# Patient Record
Sex: FEMALE | Race: WHITE
[De-identification: names, ages, dates, MRNs, and addresses within clinical notes are randomized per-mention and may not be internally consistent; named-entity substitution may affect disease eponyms.]

---

## 2017-03-18 ENCOUNTER — HOSPITAL ENCOUNTER (OUTPATIENT)
Dept: HOSPITAL 59 - ER | Age: 72
Setting detail: OBSERVATION
LOS: 1 days | Discharge: HOME | End: 2017-03-19
Attending: FAMILY MEDICINE | Admitting: FAMILY MEDICINE
Payer: MEDICARE

## 2017-03-18 DIAGNOSIS — Z79.4: ICD-10-CM

## 2017-03-18 DIAGNOSIS — J18.9: Primary | ICD-10-CM

## 2017-03-18 DIAGNOSIS — I25.10: ICD-10-CM

## 2017-03-18 DIAGNOSIS — E11.9: ICD-10-CM

## 2017-03-18 DIAGNOSIS — R79.89: ICD-10-CM

## 2017-03-18 LAB
ANION GAP SERPL CALC-SCNC: 7.8 MMOL/L (ref 7–16)
BASOPHILS NFR BLD: 1 % (ref 0–6)
BUN SERPL-MCNC: 14 MG/DL (ref 7–17)
CK MB SERPL-MCNC: 1.4 UG/L (ref 0–6)
CO2 SERPL-SCNC: 25.2 MMOL/L (ref 22–30)
CREAT SERPL-MCNC: 0.6 MG/DL (ref 0.52–1.04)
CREATINE PHOSPHOKINASE: 67 U/L (ref 30–135)
EOSINOPHIL NFR BLD: 2 % (ref 0–6)
ERYTHROCYTE [DISTWIDTH] IN BLOOD BY AUTOMATED COUNT: 12.8 % (ref 11.5–14.5)
EST GLOMERULAR FILTRATION RATE: > 60 ML/MIN
GLUCOSE SERPL-MCNC: 282 MG/DL (ref 70–110)
HCT VFR BLD CALC: 37.4 % (ref 35–47)
HGB BLD-MCNC: 12.7 GM/DL (ref 11.6–16)
LYMPHOCYTES NFR BLD: 6 % (ref 16–45)
MCH RBC QN AUTO: 30.3 PG (ref 27–33)
MCHC RBC AUTO-ENTMCNC: 34 G/DL (ref 32–36)
MCV RBC AUTO: 89.3 FL (ref 81–97)
MONOCYTES NFR BLD: 10 % (ref 0–9)
NEUTROPHILS NFR BLD AUTO: 81 % (ref 47–80)
NEUTS BAND NFR BLD: 0 % (ref 0–5)
PLATELET # BLD: 104 K/UL (ref 130–400)
PMV BLD AUTO: 11 FL (ref 7.4–10.4)
RBC # BLD AUTO: 4.19 M/UL (ref 3.8–5.4)
TROPONIN I SERPL-MCNC: 0.04 NG/ML (ref 0–0.03)
WBC # BLD AUTO: 6.3 K/UL (ref 4.2–12.2)

## 2017-03-18 PROCEDURE — 96365 THER/PROPH/DIAG IV INF INIT: CPT

## 2017-03-18 PROCEDURE — 71275 CT ANGIOGRAPHY CHEST: CPT

## 2017-03-18 PROCEDURE — 82550 ASSAY OF CK (CPK): CPT

## 2017-03-18 PROCEDURE — 84484 ASSAY OF TROPONIN QUANT: CPT

## 2017-03-18 PROCEDURE — 93010 ELECTROCARDIOGRAM REPORT: CPT

## 2017-03-18 PROCEDURE — 71020: CPT

## 2017-03-18 PROCEDURE — 94760 N-INVAS EAR/PLS OXIMETRY 1: CPT

## 2017-03-18 PROCEDURE — 82553 CREATINE MB FRACTION: CPT

## 2017-03-18 PROCEDURE — 93041 RHYTHM ECG TRACING: CPT

## 2017-03-18 PROCEDURE — 80048 BASIC METABOLIC PNL TOTAL CA: CPT

## 2017-03-18 PROCEDURE — 99285 EMERGENCY DEPT VISIT HI MDM: CPT

## 2017-03-18 PROCEDURE — 99217: CPT

## 2017-03-18 PROCEDURE — 85379 FIBRIN DEGRADATION QUANT: CPT

## 2017-03-18 PROCEDURE — 93005 ELECTROCARDIOGRAM TRACING: CPT

## 2017-03-18 PROCEDURE — 85027 COMPLETE CBC AUTOMATED: CPT

## 2017-03-18 PROCEDURE — 99220: CPT

## 2017-03-18 NOTE — EMERGENCY DEPARTMENT RECORD
History of Present Illness





- General


Chief Complaint: Shortness of breath


Stated Complaint: BHARATHI,LT ARM PAIN (NO CHEST PAIN)


Time Seen by Provider: 03/18/17 19:59


Source: Patient


Mode of Arrival: Ambulatory


Limitations: No limitations





- History of Present Illness


Initial Comments: 


The patient is here due to a one day hx of L arm pain. The pain is located over 

the humerus between the L shoulder and elbow. She denies any trauma or injury 

but states the pain comes and goes. She also is having mild BHARATHI for 2 days with 

a cough but denies any CP or any chest discomfort. She believes she is coming 

down with a cold. There has been no sputum with the cough and no fever. The 

patient does have a hx of CAD and has had 2 stents placed in 2008 and 2009. She 

did have pain with the stents which she is clearly not having now.





MD Complaint: Shortness of breath


Onset/Timing: 10


-: Hour(s)


Radiation: Left arm


Severity: Mild


Severity scale (1-10): 7


Quality: Aching


Consistency: Constant


Improves With: Nothing


Worsens With: Nothing


Associated Symptoms: Cough


Treatments Prior to Arrival: None





- Related Data


Home Oxygen Therapy: No


 Home Medications











 Medication  Instructions  Recorded  Confirmed  Last Taken


 


Insulin Aspart [Novolog] 8 unit SQ BIDDIUR 07/06/15 03/18/17 03/17/17


 


Insulin Detemir [Levemir] 70 units INJ DAILY 07/06/15 03/18/17 03/17/17


 


Lisinopril [Zestril] 5 mg PO DAILY 07/06/15 03/18/17 03/17/17


 


Aspirin 81 mg PO QD  tab.chew 10/20/16 03/18/17 03/17/17











 Allergies











Allergy/AdvReac Type Severity Reaction Status Date / Time


 


aspirin Allergy Intermediate NAUSEA Verified 03/18/17 19:36


 


Sulfa (Sulfonamide Allergy Intermediate NAUSEA AND Verified 03/18/17 19:36





Antibiotics)   VOMITING  


 


penicillin V Allergy Mild NAUSEA Verified 03/18/17 19:36


 


clindamycin AdvReac  NAUSEA Verified 03/18/17 19:38














Travel Screening





- Travel/Exposure Within Last 30 Days


Have you traveled within the last 30 days?: No





- Travel/Exposure Within Last Year


Have you traveled outside the [place] in the last year?: No





- Additonal Travel Details


Have you been exposed to anyone with a communicable illness?: No





- Travel Symptoms


Symptom Screening: None





Review of Systems


Constitutional: Denies: Chills, Fever


Eyes: Denies: Eye discharge


ENT: Reports: Congestion


Respiratory: Reports: Cough, Dyspnea


Cardiovascular: Denies: Arrhythmia, Chest pain, Dyspnea on exertion


Endocrine: Reports: Fatigue


Gastrointestinal: Denies: Diarrhea, Vomiting


Genitourinary: Denies: Dysuria


Musculoskeletal: Denies: Back pain


Skin: Denies: Bruising





Past Medical History





- SOCIAL HISTORY


Smoking Status: Never smoker


Alcohol Use: None


Drug Use: None





- RESPIRATORY


Hx Respiratory Disorders: Yes


Hx Sleep Apnea: Yes (dx)


Hx of CPAP: No





- CARDIOVASCULAR


Hx Cardio Disorders: Yes


Hx Cardiac Cath: Yes


Hx Edema: Yes


Hx Heart Attack: Yes (2008 -echo in 2016)


Hx Hypertension: Yes ((denies HTN on meds for kidneys))


Hx Coronary Stent: Yes (1-2008, 2-2009)





- NEURO


Hx Neuro Disorders: Yes


Hx of Migraines: Yes (hx)


Hx Neuropathy: Yes (right hand, left great toe)





- GI


Hx GI Disorders: Yes


Hx Abdominal Pain: Yes (epigastric)


Hx Reflux: Yes ("belching/hiccups")


Hx Hiatal Hernia: Yes


Hx of Polyps: Yes





- 


Hx Genitourinary Disorders: No





- ENDOCRINE


Hx Endocrine Disorders: Yes





- MUSCULOSKELETAL


Hx Musculoskeletal Disorders: Yes


Comment:: right shoulder injury





- PSYCH


Hx Psych Problems: No





- HEMATOLOGY/ONCOLOGY


Hx Hematology/Oncology Disorders: No





Family Medical History


Any Significant Family History?: No


Hx Cancer: Father, Brother/Sister, Grandparents


*Cancer Comment: colon, breast, ovarian


Hx Diabetes: Brother/Sister


Hx Heart Disease: Mother





Physical Exam





- General


General Appearance: Alert, Oriented x3, Cooperative, No acute distress





- Head


Head exam: Atraumatic, Normocephalic, Normal inspection





- Eye


Eye exam: Normal appearance, PERRL





- ENT


Throat exam: Normal inspection.  negative: Tonsillar erythema, Tonsillar exudate





- Neck


Neck exam: Normal inspection, Full ROM.  negative: Tenderness





- Respiratory


Respiratory exam: Normal lung sounds bilaterally.  negative: Respiratory 

distress





- Cardiovascular


Cardiovascular Exam: Regular rate, Normal rhythm, Systolic murmur (2/6 (chronic 

per the patient))





- GI/Abdominal


GI/Abdominal exam: Soft, Normal bowel sounds.  negative: Tenderness





- Extremities


Extremities exam: Normal inspection, Full ROM, Normal capillary refill, 

Tenderness (There is reproducible tenderness to the L humerus area.)





- Neurological


Neurological exam: Normal gait.  negative: Abnormal gait





Course





 Vital Signs











  03/18/17





  19:36


 


Temperature 99.1 F


 


Pulse Rate 80


 


Respiratory 20





Rate 


 


Blood Pressure 206/68


 


Pulse Ox 97














- Reevaluation(s)


Reevaluation #1: 


The patient is doing very well at this time. She denies any pain or BHARATHI. We are 

waiting on lab results at this time.





03/18/17 21:00





Reevaluation #2: 


The patient is doing very well. She denies any pain or BHARATHI. I did discuss the 

CT and lab results and the need for admission and she does agree to the plan. 

We will admit her to Dr. Boggs.





03/18/17 22:21








Medical Decision Making





- Data Complexity


MDM Data: Labs Ordered and/or Reviewed, X-Ray Ordered and/or Reviewed, EKG 

Ordered and/or Reviewed





- Lab Data


Result diagrams: 


 03/18/17 20:37





 03/18/17 20:37





- EKG Data


-: EKG Interpreted by Me


EKG: No Acute Changes, Unchanged From Previous





- Radiology Data


Radiology results: Report reviewed (CXR: Neg.   Chest CT: No PE but possible 

pneumonitis in the lower lobes and RML.)





Disposition


Disposition: Admit


Clinical Impression: 


 Acute dyspnea


Disposition: Still a Patient at [facility]


Decision to Admit: Admit from ER


Decision to Admit Date: 03/18/17


Decision to Admit Time: 22:22


Accepting Physician: Flakita


Time Discussed w/Accepting Physician: 22:22


Instructions:  Dyspnea (ED)


Forms:  Patient Portal Access


Time of Disposition: 22:22

## 2017-03-19 LAB
CK MB SERPL-MCNC: 0.9 UG/L (ref 0–6)
TROPONIN I SERPL-MCNC: 0.07 NG/ML (ref 0–0.03)

## 2017-03-19 NOTE — DISCHARGE NOTE
Discharge Note





- Date


Date of Discharge Note: 03/19/17


Disposition: Home, Self-Care


Condition: (1) Good


Instructions:  Dyspnea (ED)


Additional Instructions: 


follow up with Dr Montanez in 3 to 8 days 


follow up with Dr. Mckeon in 5 days for cardiac consult and possible stress test.


take levaquin 500 mg once a day for pneumonia


tylenol for upper back and neck pain and heat to neck and upper back three 

times a day


Prescriptions: 


Levofloxacin [Levaquin Tab] 500 mg PO DAILY #7 tab


Forms:  Patient Portal Access

## 2017-03-20 NOTE — RADIOLOGY REPORT
EXAM:  CHEST, TWO VIEWS



HISTORY:  DIFFICULTY BREATHING.  



TECHNIQUE:  Frontal and lateral views of the chest were performed.  



FINDINGS:  The heart size is normal.  The lung fields are clear.  The osseous 
structures are normal.  



IMPRESSION:  

NEGATIVE CHEST EXAMINATION.  



JOB NUMBER:  [id number]
MTDD

## 2017-03-20 NOTE — CT ANGIOGRAM REPORT
EXAM:  CTA OF THE CHEST WITH CONTRAST 



HISTORY:  DIFFICULTY IN BREATHING, CHEST PAIN.  



TECHNIQUE:  CTA of the chest was performed after intravenous administration of 
80 ml of Omnipaque 350 contrast material.  Sagittal and coronal MIP images were 
performed on an independent workstation.  



FINDINGS:  There is no mass or filling defect to suggest pulmonary embolism.  
The thoracic aorta appears normal.  There is coronary artery vascular 
calcification.  There is cardiomegaly without pericardial effusion.  There are 
bilateral lower lobe infiltrates suggestive of pneumonia.  The visualized upper 
abdominal structures are normal.  



IMPRESSION:  

1.  NO CTA FINDINGS SUGGESTIVE OF PULMONARY EMBOLISM.  



2.  SUBTLE INFILTRATES IN BOTH LUNG BASES LIKELY RELATED TO PNEUMONITIS.  



3.  CARDIOMEGALY WITHOUT PERICARDIAL EFFUSION.  



JOB NUMBER:  [id number]
[facility]

## 2017-03-20 NOTE — HISTORY AND PHYSICAL REPORT
DATE OF DICTATION:  03/19/2017.



CHIEF COMPLAINT:  Cough, congestion, and some left shoulder and arm numbness.  



HISTORY OF PRESENT ILLNESS:  She states that she woke up yesterday morning, on 
the day of admission, with some tingling in the left arm.  She complained of 
being short of breath around 10:00 in the morning.  She has had a cold and 
believes that is why she is having some difficulty breathing.  The patient 
denies any injuries to her arm.  She is painful when palpating the upper 
thoracic back area by the neck.  The pain goes down into her arm when doing 
that.  She was seen in the emergency department by Dr. Holland who worked her up 
for cardiac disease.  The troponin I was in the indeterminate range.  She also 
has an elevated D-dimer, and so a CT angiogram was done.  This was negative for 
a pulmonary embolus but showed early pneumonia.  She was put in the hospital 
for intravenous Levaquin, serial cardiac enzymes, and further evaluation.  She 
is not shortness of breath at this time at my examination.  She has no no chest 
pain and no arm pain.  She does have pain in the neck and upper thoracic area 
on the left side when I palpate that area.  She states that the cough has been 
going on for ten days, but she just thought it was a cold which was not getting 
better.  She states that her throat is a little dry.  



PAST MEDICAL HISTORY:  Diabetes mellitus, coronary artery disease.  She had 
stents placed in 2008 when she had a myocardial infarction.  She sees Dr. Mckeon.  She also has gastroesophageal reflux disease.  Her diabetes mellitus 
has been going on for 20 years.  She is on both Lantus and NovoLog.  She has 
degenerative joint disease in her back.  She has had a right shoulder injury. 



PAST SURGICAL HISTORY:  Cholecystectomy, exploratory tubal, appendectomy, 
bilateral trigger fingers, colonoscopy, and EGD.



MEDICATIONS ON ADMISSION:  Lisinopril 5.0 mg q. daily, Levemir 70 units q. daily
, NovoLog 8.0 units b.i.d. before meals, aspirin 81 mg q. daily.



ALLERGIES:  Sulfa, penicillin, and clindamycin.



FAMILY/PSYCHOSOCIAL HISTORY:  Cancer in the father, brother, sister, and 
grandparents; colon, breast, and ovarian.  There is diabetes in her brothers 
and sisters.  Her mother has heart disease.  She has never smoked cigarettes.  
No alcohol or drug use.



REVIEW OF SYSTEMS:  

HEENT:  She has had a cough, congestion, and cold for the last ten days.  It 
has been progressively getting worse.  When she woke up yesterday, on the day 
of admission, she had numbness in the left shoulder.  She was concerned about 
that and came in.  Mostly, she told the nurse she was short of breath, and she 
thought it was from her cold.  She wanted that evaluated.

Cardiovascular:  No chest pain or palpitations.  No arrhythmias.  She is being 
followed by Dr. Mckeon.  She had an echocardiogram in December of 2016 at [facility] which was normal, according to the patient.

Respiratory:  See Chief Complaint.  She has been short of breath.  She has been 
coughing over the last ten days.  She was diagnosed in the emergency department 
with pneumonia.

Gastrointestinal:  No nausea, vomiting, diarrhea, black stools, or bloody 
stools.

Genitourinary:  No dysuria, hematuria, frequency, or burning on urination.

Musculoskeletal:  She has pain in her back and has also had problems with her 
right shoulder.  The left shoulder and upper back are giving her troubles now.

Neurologic:  No cerebrovascular accident, paralysis, or paraesthesias.

Endocrine:  She has had diabetes for 20 years.  

Integument:  No rash, ulcers, changing moles, or yellow skin.



PHYSICAL EXAMINATION:  

General:  Height is 5 feet, 3 inches.  Weight is 192 pounds.

Vital Signs:  Temperature is 99.7, pulse is 79, blood pressure is 150/65, 
respiratory rate is 18, pulse oximetry is 97% on room air.  

HEENT:  Pupils are equal, round, and reactive to light and accommodation.  
Extraocular muscles are intact.  The throat is clear.  The nose is clear.  The 
tympanic membranes are gray.  

Neck:  The neck is supple.  No jugular venous distension.  No hepatojugular 
reflex.  No carotid bruit.  The thyroid is smooth.  

Cardiovascular:  Regular rate and rhythm without murmurs, clicks, rubs, or 
gallops.

Respiratory:  Clear to auscultation and percussion.  There might be some 
bilateral lower base rales which are very faint.  This seemed to clear when she 
coughed.

Abdomen:  Soft and nontender.  No hepatosplenomegaly.  No masses.  No 
tenderness.  Bowel sounds are active.  No bruit.

Extremities:  No pitting edema.  No cyanosis.  No clubbing.  Full range of 
motion.  Peripheral pulses are good.

Breasts, Gynecological, and Rectal Examinations:  Deferred.

Neurological Examination:  Cranial nerves II through XII are intact.  No gross 
defect.  Sensation is normal.  Strength is normal.  Deep tendon reflexes are 
equal bilaterally.  Babinski is negative.

Mental Status:  Alert and oriented times three.

  

IMPRESSIONS:  

1.  Pneumonia bilaterally as seen by CT scan.  Troponin I is in the 
indeterminate range.  However, she has no acute changes on the EKG, and she is 
having no chest pain with walking around the room.

2.  Diabetes mellitus, insulin dependent.



PLAN:  She is looking very good at this point.  She is not requiring oxygen.  
She is eating, drinking, and walking around the room without difficulty.  She 
is on Levaquin.  We will send her home with Levaquin 500 mg q. daily for seven 
days.  Follow up with Dr. Montanez in three to eight days.  Follow up with Dr. Mckeon 
this coming week for for evaluation of her indeterminate troponin I. 







______________________________      _________________________________

 Andrea Boggs D.O.         Date      Time



JOB NUMBER:  [id number]
MTDD

## 2017-03-22 NOTE — DISCHARGE SUMMARY
DISCHARGE DIAGNOSES: 

1.  Bilateral pneumonia.  Seen by CTA scan. 

2.  Troponin I is indeterminate. 

3.  Diabetes mellitus insulin dependent. 

4.  Coronary artery disease by history.  Cardiac stents 2008.  Dr. Mckeon is her cardiologist. 

5.  Upper thoracic muscular strain with radiculopathy and to the left arm. 



ATTENDING PHYSICIAN: Andrea Boggs DO



HISTORY OF PRESENT ILLNESS: This is a 71-year-old female who came in because of shortness of breath, 
dyspnea and a cough.  She also complained of some numbness in her left shoulder and arm.  She had 
some neck and thoracic back pain.  She was worked up in the Emergency Department by Dr. Holland with a 
cardiac workup.  Her D-dimer was elevated.  A CTA was done which showed no PEs but did show 
bilateral pneumonia, ground glass appearance of the CAT scan, and her EKG showing no acute changes.  
Troponin I was at the indeterminate range.  It went up slightly with the second troponin I.  The 
CK-MB's went down.  EKG: The second EKG was the same as the first EKG, showing no acute changes and 
normal sinus rhythm.  She is having no chest pain and her breathing is much better today, and the 
numbness in the arm is gone.  She does have some pain in the neck and upper thoracic area when I 
palpated that area on examination.  



THERAPY PROVIDED: The patient was put on the monitor, started on Levaquin 500 mg IV.  At my 
examination, she was asymptomatic, she was not having any problems breathing, she was walking around 
the room, she ate breakfast nicely.  She had some pain on palpation of the neck and upper thoracic 
area that did not sound cardiac in origin at all.  At this point I felt it safe to discharge her 
home and to finish the workup as an outpatient. 



HOSPITAL COURSE: The patient is improved. 



CONDITION AT DISCHARGE: Improved. 



DISPOSITION: She is admitted as an observation patient. 



DISCHARGE INSTRUCTIONS: Follow up with Dr. Montanez in 3-8 days.  Follow up with Dr. Mckeon in the next 
week and within 5 days for a cardiac evaluation and a possible stress test.  Use Tylenol for the 
pain in her back and neck area.  Heat to the upper back and neck area 3 times a day. 



DISCHARGE MEDICATIONS:

1.  Levaquin 500 mg daily for 7 days. 

Her home medications: 

2.  Lisinopril 5 mg daily. 

3.  Levemir 70 units daily. 

4.  NovoLog 8 units b.i.d. p.r.n. depending on what her sugars are before meals. 

5.  Aspirin 81 mg daily. 



ACTIVITY: Just walking until she is evaluated by Dr. Mckeon for more strenuous activity. 



CC: Dr. Linda KONG

## 2017-07-01 ENCOUNTER — HOSPITAL ENCOUNTER (EMERGENCY)
Dept: HOSPITAL 59 - ER | Age: 72
Discharge: HOME | End: 2017-07-01
Payer: MEDICARE

## 2017-07-01 DIAGNOSIS — N30.01: Primary | ICD-10-CM

## 2017-07-01 LAB
APPEARANCE UR: (no result)
BACTERIA #/AREA URNS HPF: (no result) /[HPF]
BILIRUB UR-MCNC: NEGATIVE MG/DL
COLOR UR: YELLOW
KETONES UR QL STRIP: NEGATIVE
L PNEUMO AB SER IA-ACNC: (no result)
NITRITE UR QL STRIP: NEGATIVE
RBC # UR STRIP: (no result) /UL
URINE LEUKOCYTE ESTERASE: (no result)
UROBILINOGEN UR STRIP-ACNC: 1 E.U./DL (ref 0.2–1)
WBC #/AREA URNS HPF: (no result) /[HPF]

## 2017-07-01 PROCEDURE — 99282 EMERGENCY DEPT VISIT SF MDM: CPT

## 2017-07-01 PROCEDURE — 81001 URINALYSIS AUTO W/SCOPE: CPT

## 2017-07-01 NOTE — EMERGENCY DEPARTMENT RECORD
History of Present Illness





- General


Chief complaint: Female Urogenital Problem


Stated complaint: hip pain/ yeast infection


Time Seen by Provider: 17 08:13


Source: Patient


Mode of Arrival: Ambulatory


Limitations: No limitations





- History of Present Illness


Initial comments: 





72 yo female presents to ED with a CC of urinary discomfort and left sided back 

pain symptoms.  Patient reports that her back pain symptoms occur 

intermittently for several years with activity, reports taking Tylenol last 

night which improved her symptoms.  Patient reports dysuria symptoms that began 

this morning, reports symptoms may be related to infection vs. yeast.  Patient 

denies vaginal discharge , fevers, chills, or recent illness symptoms.


MD Complaint: Dysuria


Onset/Timin


-: Month(s)


Quality: Aching


Consistency: Constant


Worsens with: Movement


Associated Symptoms: Denies other symptoms





- Related Data


 Home Medications











 Medication  Instructions  Recorded  Confirmed  Last Taken


 


Insulin Aspart [Novolog] 8 unit SQ BIDDIUR 07/06/15 07/01/17 06/30/17


 


Insulin Detemir [Levemir] 70 units INJ DAILY 07/06/15 07/01/17 07/01/17


 


Lisinopril [Zestril] 5 mg PO DAILY 07/06/15 07/01/17 07/01/17


 


Aspirin 81 mg PO QD  tab.chew 10/20/16 07/01/17 07/01/17








 Previous Rx's











 Medication  Instructions  Recorded


 


Fluconazole [Diflucan] 150 mg PO ONCE #1 tab 17


 


Nitrofurantoin Mono [Macrobid] 100 mg PO BID #14 capsule 17











 Allergies











Allergy/AdvReac Type Severity Reaction Status Date / Time


 


aspirin Allergy Intermediate NAUSEA Unverified 17 08:10


 


Sulfa (Sulfonamide Allergy Intermediate NAUSEA AND Unverified 17 08:10





Antibiotics)   VOMITING  


 


penicillin V Allergy Mild NAUSEA Unverified 17 08:10


 


clindamycin AdvReac  NAUSEA Unverified 17 08:10














Travel Screening





- Travel/Exposure Within Last 30 Days


Have you traveled within the last 30 days?: No





- Travel/Exposure Within Last Year


Have you traveled outside the [place] in the last year?: No





- Additonal Travel Details


Have you been exposed to anyone with a communicable illness?: No





- Travel Symptoms


Symptom Screening: None





Review of Systems


Constitutional: Denies: Chills, Fever, Malaise, Night sweats


Eyes: Denies: Eye discharge, Eye pain


ENT: Denies: Congestion, Ear pain, Epistaxis


Respiratory: Denies: Cough, Dyspnea


Cardiovascular: Denies: Chest pain, Dyspnea on exertion


Endocrine: Denies: Fatigue


Gastrointestinal: Denies: Abdominal pain, Nausea, Vomiting


Genitourinary: Denies: Incontinence, Retention


Musculoskeletal: Reports: Back pain.  Denies: Arthralgia, Gout, Joint swelling


Skin: Denies: Bruising, Change in color


Neurological: Denies: Abnormal gait, Confusion, Headache, Seizure


Psychiatric: Denies: Anxiety


Hematological/Lymphatic: Denies: Anemia, Blood Clots





Past Medical History





- SOCIAL HISTORY


Smoking Status: Never smoker


Alcohol Use: None


Drug Use: None





- RESPIRATORY


Hx Respiratory Disorders: Yes


Hx Sleep Apnea: Yes (dx)


Hx of CPAP: No





- CARDIOVASCULAR


Hx Cardio Disorders: Yes


Hx Cardiac Cath: Yes


Hx Edema: Yes


Hx Heart Attack: Yes ( -echo in 2016)


Hx Hypertension: Yes ((denies HTN on meds for kidneys))


Hx Coronary Stent: Yes (-, 2-)





- NEURO


Hx Neuro Disorders: Yes


Hx of Migraines: Yes (hx)


Hx Neuropathy: Yes (right hand, left great toe)





- GI


Hx GI Disorders: Yes


Hx Abdominal Pain: Yes (epigastric)


Hx Reflux: Yes ("belching/hiccups")


Hx Hiatal Hernia: Yes


Hx of Polyps: Yes





- 


Hx Genitourinary Disorders: No





- ENDOCRINE


Hx Endocrine Disorders: Yes





- MUSCULOSKELETAL


Hx Musculoskeletal Disorders: Yes


Comment:: right shoulder injury





- PSYCH


Hx Psych Problems: No





- HEMATOLOGY/ONCOLOGY


Hx Hematology/Oncology Disorders: No





Family Medical History


Any Significant Family History?: No


Hx Cancer: Father, Brother/Sister, Grandparents


*Cancer Comment: colon, breast, ovarian


Hx Diabetes: Brother/Sister


Hx Heart Disease: Mother





Physical Exam





- General


General Appearance: Alert, Oriented x3, Cooperative, Mild distress


Limitations: No limitations





- Head


Head exam: Atraumatic, Normocephalic, Normal inspection


Head exam detail: negative: Abrasion, Contusion, Pugh's sign, General 

tenderness, Hematoma, Laceration





- Eye


Eye exam: Normal appearance.  negative: Conjunctival injection, Periorbital 

swelling, Periorbital tenderness, Scleral icterus





- ENT


Ear exam: negative: Auricular hematoma, Auricular trauma


Nasal Exam: negative: Active bleeding, Discharge, Dried blood


Mouth exam: negative: Drooling, Laceration, Muffled voice, Tongue elevation





- Neck


Neck exam: Normal inspection.  negative: Meningismus, Tenderness





- Respiratory


Respiratory exam: Normal lung sounds bilaterally.  negative: Rales, Respiratory 

distress, Rhonchi, Stridor





- Cardiovascular


Cardiovascular Exam: Regular rate, Normal rhythm, Normal heart sounds





- GI/Abdominal


GI/Abdominal exam: Soft.  negative: Rebound, Rigid, Tenderness





- Rectal


Rectal exam: Deferred





- 


 exam: Deferred





- Extremities


Extremities exam: Normal inspection.  negative: Pedal edema, Tenderness





- Back


Back exam: Reports: Paraspinal tenderness (left lumbar paravertebral tenderness)

.  Denies: CVA tenderness (R), CVA tenderness (L)





- Neurological


Neurological exam: Alert, Normal gait, Oriented X3





- Psychiatric


Psychiatric exam: Normal affect, Normal mood





- Skin


Skin exam: Normal color.  negative: Abrasion


Type of lesion: negative: abrasion





Course





 Vital Signs











  17





  08:02


 


Temperature 97.5 F L


 


Pulse Rate 73


 


Respiratory 16





Rate 


 


Blood Pressure 143/61


 


Pulse Ox 98














- Reevaluation(s)


Reevaluation #1: 





17 08:43


UA reviewed, 3-6 RBCs does not seem compatible with ureteral calculi, therfore 

CT imaging for stones does not appear indicated at this time.  Patient's 

clinical appearance is not c/w kidney infection/calculus either.  Will treat 

for both possible UTI and yeast with instructions to return for any worsening 

of her symptoms.  Patient agrees with the plan of care as discussed, and 

appears stable for discharge at this time.





Disposition


Disposition: Discharge


Clinical Impression: 


 Yeast cystitis





UTI (urinary tract infection)


Qualifiers:


 Urinary tract infection type: acute cystitis Hematuria presence: with 

hematuria Qualified Code(s): N30.01 - Acute cystitis with hematuria





Disposition: Home, Self-Care


Condition: (2) Stable


Instructions:  Urinary Tract Infection in Women (ED)


Additional Instructions: 


Return to ED if your symptoms worsen or if you have any concerns.


Macrobid and Diflucan as directed.


Follow-up with your family doctor in 3-5 days as directed.


Prescriptions: 


Fluconazole [Diflucan] 150 mg PO ONCE #1 tab


Nitrofurantoin Mono [Macrobid] 100 mg PO BID #14 capsule


Forms:  Patient Portal Access


Time of Disposition: 08:47

## 2017-12-08 ENCOUNTER — HOSPITAL ENCOUNTER (EMERGENCY)
Dept: HOSPITAL 59 - ER | Age: 72
Discharge: ANOTHER HOSPITAL | End: 2017-12-08
Payer: MEDICARE

## 2017-12-08 DIAGNOSIS — I25.2: ICD-10-CM

## 2017-12-08 DIAGNOSIS — R79.89: ICD-10-CM

## 2017-12-08 DIAGNOSIS — I10: ICD-10-CM

## 2017-12-08 DIAGNOSIS — R07.2: Primary | ICD-10-CM

## 2017-12-08 DIAGNOSIS — R05: ICD-10-CM

## 2017-12-08 LAB
ALBUMIN SERPL-MCNC: 3 G/DL (ref 4–5)
ALBUMIN/GLOB SERPL: 0.7 {RATIO} (ref 1.1–1.8)
ALP SERPL-CCNC: 76 U/L (ref 35–104)
ALT SERPL-CCNC: 16 U/L (ref ?–33)
ANION GAP SERPL CALC-SCNC: 13 MMOL/L (ref 7–16)
AST SERPL-CCNC: 37 U/L (ref 10–35)
BASOPHILS NFR BLD: 0.3 % (ref 0–6)
BILIRUB SERPL-MCNC: 0.5 MG/DL (ref 0.2–1)
BUN SERPL-MCNC: 26 MG/DL (ref 8–23)
CO2 SERPL-SCNC: 23 MMOL/L (ref 22–29)
CREAT SERPL-MCNC: 0.9 MG/DL (ref 0.5–0.9)
EOSINOPHIL NFR BLD: 0.9 % (ref 0–6)
ERYTHROCYTE [DISTWIDTH] IN BLOOD BY AUTOMATED COUNT: 13.2 % (ref 11.5–14.5)
EST GLOMERULAR FILTRATION RATE: > 60 ML/MIN
GLOBULIN SER-MCNC: 4.1 GM/DL (ref 1.4–4.8)
GLUCOSE SERPL-MCNC: 421 MG/DL (ref 74–109)
GRANULOCYTES NFR BLD: 80.5 % (ref 47–80)
HCT VFR BLD CALC: 35.3 % (ref 35–47)
HGB BLD-MCNC: 11.9 GM/DL (ref 11.6–16)
LYMPHOCYTES NFR BLD AUTO: 12.8 % (ref 16–45)
MCH RBC QN AUTO: 30.7 PG (ref 27–33)
MCHC RBC AUTO-ENTMCNC: 33.7 G/DL (ref 32–36)
MCV RBC AUTO: 91.2 FL (ref 81–97)
MONOCYTES NFR BLD: 5.5 % (ref 0–9)
PLATELET # BLD: 190 K/UL (ref 130–400)
PMV BLD AUTO: 10.9 FL (ref 7.4–10.4)
PROT SERPL-MCNC: 7.1 G/DL (ref 6.6–8.7)
RBC # BLD AUTO: 3.87 M/UL (ref 3.8–5.4)
WBC # BLD AUTO: 7.4 K/UL (ref 4.2–12.2)

## 2017-12-08 PROCEDURE — 93010 ELECTROCARDIOGRAM REPORT: CPT

## 2017-12-08 PROCEDURE — 99285 EMERGENCY DEPT VISIT HI MDM: CPT

## 2017-12-08 PROCEDURE — 80053 COMPREHEN METABOLIC PANEL: CPT

## 2017-12-08 PROCEDURE — 71020: CPT

## 2017-12-08 PROCEDURE — 85025 COMPLETE CBC W/AUTO DIFF WBC: CPT

## 2017-12-08 PROCEDURE — 84484 ASSAY OF TROPONIN QUANT: CPT

## 2017-12-08 PROCEDURE — 93005 ELECTROCARDIOGRAM TRACING: CPT

## 2017-12-08 NOTE — EMERGENCY DEPARTMENT RECORD
History of Present Illness





- General


Chief Complaint: Chest Pain


Stated Complaint: CHEST PAIN AND SHOULDER PAIN


Time Seen by Provider: 17 19:51


Source: Patient


Mode of Arrival: Wheelchair


Limitations: No limitations





- History of Present Illness


Initial Comments: 





73 yo female presents to ED for evaluation of chest pain across the chest, 

radiating to her shoulders that began 1 hours PTA.  Patient reports taking 

Nitro SL prior to arrival which improved her symptoms, however her symptoms 

returned prompting presentation to the ED.  Patient reports previous "stent in 

side of a stent" several years ago, reports that she has not undergone stress 

testing in several years (sees Dr. Mckeon).  Patient denies fevers, chills, or 

recent illness other than a recent ear infection.


MD Complaint: Chest pain


Onset/Timin


-: Hour(s)


Pain Location: Substernal


Pain Radiation: RUE, LUE


Severity: Moderate


Quality: Aching


Consistency: Intermittent


Improves With: Nitroglycerin


Worsens With: Nothing


Context: New medications (prednisone)


Treatments Prior to Arrival: Nitroglycerin





- Related Data


On Oral Contraceptives: No


 Home Medications











 Medication  Instructions  Recorded  Confirmed  Last Taken


 


Levofloxacin [Levaquin Tab] 500 mg PO DAILY 17 Unknown


 


Prednisone 5 mg PO BID 17 Unknown











 Allergies











Allergy/AdvReac Type Severity Reaction Status Date / Time


 


aspirin Allergy Intermediate NAUSEA Verified 17 20:08


 


Sulfa (Sulfonamide Allergy Intermediate NAUSEA AND Verified 17 20:08





Antibiotics)   VOMITING  


 


penicillin V Allergy Mild NAUSEA Verified 17 20:08


 


clindamycin AdvReac  NAUSEA Verified 17 20:08














Review of Systems


Constitutional: Denies: Chills, Fever, Malaise, Night sweats


Eyes: Denies: Eye discharge, Eye pain


ENT: Denies: Congestion, Ear pain, Epistaxis


Respiratory: Denies: Cough, Dyspnea


Cardiovascular: Reports: Chest pain.  Denies: Dyspnea on exertion


Endocrine: Denies: Fatigue, Heat or cold intolerance


Gastrointestinal: Denies: Abdominal pain, Nausea, Vomiting


Genitourinary: Denies: Incontinence, Retention


Musculoskeletal: Denies: Arthralgia, Back pain


Skin: Denies: Bruising, Change in color


Neurological: Denies: Abnormal gait, Confusion, Headache, Seizure


Psychiatric: Denies: Anxiety


Hematological/Lymphatic: Denies: Anemia, Blood Clots





Past Medical History





- SOCIAL HISTORY


Smoking Status: Never smoker


Drug Use: None





- RESPIRATORY


Hx Respiratory Disorders: Yes


Hx Sleep Apnea: Yes (dx)


Hx of CPAP: No





- CARDIOVASCULAR


Hx Cardio Disorders: Yes


Hx Cardiac Cath: Yes


Hx Edema: Yes


Hx Heart Attack: Yes (2008 -echo in 2016)


Hx Hypertension: Yes ((denies HTN on meds for kidneys))


Hx Coronary Stent: Yes (1-, 2-)





- NEURO


Hx Neuro Disorders: Yes


Hx of Migraines: Yes (hx)


Hx Neuropathy: Yes (right hand, left great toe)





- GI


Hx GI Disorders: Yes


Hx Abdominal Pain: Yes (epigastric)


Hx Reflux: Yes ("belching/hiccups")


Hx Hiatal Hernia: Yes


Hx of Polyps: Yes





- 


Hx Genitourinary Disorders: No





- ENDOCRINE


Hx Endocrine Disorders: Yes





- MUSCULOSKELETAL


Hx Musculoskeletal Disorders: Yes


Comment:: right shoulder injury





- PSYCH


Hx Psych Problems: No





- HEMATOLOGY/ONCOLOGY


Hx Hematology/Oncology Disorders: No





Family Medical History


Hx Cancer: Father, Brother/Sister, Grandparents


*Cancer Comment: colon, breast, ovarian


Hx Diabetes: Brother/Sister


Hx Heart Disease: Mother





Physical Exam





- General


General Appearance: Alert, Oriented x3, Cooperative, Mild distress (patient is 

pain-free currently)


Limitations: No limitations





- Head


Head exam: Atraumatic, Normocephalic, Normal inspection


Head exam detail: negative: Abrasion, Contusion, Pugh's sign, General 

tenderness, Hematoma, Laceration





- Eye


Eye exam: Normal appearance.  negative: Conjunctival injection, Periorbital 

swelling, Periorbital tenderness, Scleral icterus





- ENT


Ear exam: negative: Auricular hematoma, Auricular trauma


Nasal Exam: negative: Active bleeding, Discharge, Dried blood, Foreign body


Mouth exam: negative: Drooling, Laceration, Muffled voice, Tongue elevation





- Neck


Neck exam: Normal inspection.  negative: Meningismus, Tenderness





- Respiratory


Respiratory exam: Normal lung sounds bilaterally.  negative: Rales, Respiratory 

distress, Rhonchi, Stridor





- Cardiovascular


Cardiovascular Exam: Regular rate, Normal rhythm, Normal heart sounds





- GI/Abdominal


GI/Abdominal exam: Soft.  negative: Rebound, Rigid, Tenderness





- Rectal


Rectal exam: Deferred





- 


 exam: Deferred





- Extremities


Extremities exam: Normal inspection.  negative: Calf tenderness, Pedal edema, 

Tenderness





- Back


Back exam: Denies: CVA tenderness (R), CVA tenderness (L)





- Neurological


Neurological exam: Alert, Normal gait, Oriented X3





- Psychiatric


Psychiatric exam: Normal affect, Normal mood





- Skin


Skin exam: Normal color.  negative: Abrasion


Type of lesion: negative: abrasion





Course





- Reevaluation(s)


Reevaluation #1: 





17 20:06


EKG: NSR 83


LAD, short DE


Q waves V1-V5, No acute ST-T wave changes c/w 3/19/17


Reevaluation #2: 





17 20:52


Labs reviewed, Glucose 421, Troponin 0.023.  Labs are otherwise grossly 

unremarkable for an acute process.


CXR: No acute process.


Reevaluation #3: 





17 21:02


Case was discussed with Dr. Helms, will accept transfer for further cardiology 

evaluation.


Reevaluation #4: 





17 21:47


Bed assignment has been received, awaiting EMS for transfer.  Patient was 

reassessed, continues to deny any chest pain symptoms.  Will continue to 

monitor pending transfer.











Medical Decision Making





- Lab Data


Result diagrams: 


 17 20:10





 17 20:10





Disposition


Disposition: Transfer


Clinical Impression: 


 Elevated troponin





Chest pain


Qualifiers:


 Chest pain type: unspecified Qualified Code(s): R07.9 - Chest pain, unspecified





CAD (coronary artery disease)


Qualifiers:


 Coronary Disease-Associated Artery/Lesion type: unspecified vessel or lesion 

type Native vs. transplanted heart: native heart Associated angina: with 

unstable angina Qualified Code(s): I25.110 - Atherosclerotic heart disease of 

native coronary artery with unstable angina pectoris





Disposition: Acute Care Hospital Transfer


Transfer To: [facility]


Reason For Transfer: Cardiology consultation


Accepting Physician: Analia


Time Discussed w/Accepting Physician: 20:55


Forms:  Patient Portal Access


Time of Disposition: 20:55





Quality





- Quality Measures


Quality Measures: N/A





- Blood Pressure Screening


Does Patient Have Any of the Following: Active Dx of HTN


Blood Pressure Classification: Hypertensive Reading


Systolic Measurement: 192


Diastolic Measurement: 73


Screening for High Blood Pressure: Patient Exclusion, Hx of HTN []

## 2017-12-09 NOTE — RADIOLOGY REPORT
EXAM:  CHEST 2 VIEWS



HISTORY:  ACUTE NONPRODUCTIVE COUGH.



COMPARISON:  Chest x-ray 03/18/17.



TECHNIQUE:  Two-view chest.



FINDINGS:  The lungs are clear. Cardiac silhouette, diaphragm, and osseous 
structures are unremarkable for age.



IMPRESSION:  NEGATIVE CHEST.



JOB NUMBER:  [id number]
MTDD

## 2018-08-11 ENCOUNTER — HOSPITAL ENCOUNTER (EMERGENCY)
Dept: HOSPITAL 59 - ER | Age: 73
Discharge: HOME | End: 2018-08-11
Payer: MEDICARE

## 2018-08-11 DIAGNOSIS — Z87.442: ICD-10-CM

## 2018-08-11 DIAGNOSIS — R10.31: Primary | ICD-10-CM

## 2018-08-11 DIAGNOSIS — R30.0: ICD-10-CM

## 2018-08-11 DIAGNOSIS — Z79.4: ICD-10-CM

## 2018-08-11 DIAGNOSIS — I25.2: ICD-10-CM

## 2018-08-11 DIAGNOSIS — I10: ICD-10-CM

## 2018-08-11 DIAGNOSIS — R11.2: ICD-10-CM

## 2018-08-11 LAB
ALBUMIN SERPL-MCNC: 3.3 G/DL (ref 4–5)
ALBUMIN/GLOB SERPL: 0.9 {RATIO} (ref 1.1–1.8)
ALP SERPL-CCNC: 72 U/L (ref 35–104)
ALT SERPL-CCNC: 22 U/L (ref ?–33)
ANION GAP SERPL CALC-SCNC: 11 MMOL/L (ref 7–16)
APPEARANCE UR: (no result)
AST SERPL-CCNC: 47 U/L (ref 10–35)
BACTERIA #/AREA URNS HPF: (no result) /[HPF]
BASOPHILS NFR BLD: 0.3 % (ref 0–6)
BILIRUB SERPL-MCNC: 0.8 MG/DL (ref 0.2–1)
BILIRUB UR-MCNC: NEGATIVE MG/DL
BUN SERPL-MCNC: 19 MG/DL (ref 8–23)
CO2 SERPL-SCNC: 24 MMOL/L (ref 22–29)
COLOR UR: YELLOW
CREAT SERPL-MCNC: 0.7 MG/DL (ref 0.5–0.9)
EOSINOPHIL NFR BLD: 2.3 % (ref 0–6)
ERYTHROCYTE [DISTWIDTH] IN BLOOD BY AUTOMATED COUNT: 13.5 % (ref 11.5–14.5)
EST GLOMERULAR FILTRATION RATE: > 60 ML/MIN
GLOBULIN SER-MCNC: 3.7 GM/DL (ref 1.4–4.8)
GLUCOSE SERPL-MCNC: 244 MG/DL (ref 74–109)
GLUCOSE UR STRIP-MCNC: (no result) MG/DL
GRANULOCYTES NFR BLD: 65.3 % (ref 47–80)
HCT VFR BLD CALC: 33.7 % (ref 35–47)
HGB BLD-MCNC: 11 GM/DL (ref 11.6–16)
KETONES UR QL STRIP: NEGATIVE
LIPASE SERPL-CCNC: 30 U/L (ref 13–60)
LYMPHOCYTES NFR BLD AUTO: 22.8 % (ref 16–45)
MCH RBC QN AUTO: 30.3 PG (ref 27–33)
MCHC RBC AUTO-ENTMCNC: 32.6 G/DL (ref 32–36)
MCV RBC AUTO: 92.8 FL (ref 81–97)
MONOCYTES NFR BLD: 9.3 % (ref 0–9)
NITRITE UR QL STRIP: NEGATIVE
PLATELET # BLD: 109 K/UL (ref 130–400)
PMV BLD AUTO: 11.1 FL (ref 7.4–10.4)
PROT SERPL-MCNC: 7 G/DL (ref 6.6–8.7)
RBC # BLD AUTO: 3.63 M/UL (ref 3.8–5.4)
RBC # UR STRIP: (no result) /UL
RBC #/AREA URNS HPF: (no result) /[HPF]
URINE LEUKOCYTE ESTERASE: NEGATIVE
UROBILINOGEN UR STRIP-ACNC: 0.2 E.U./DL (ref 0.2–1)
WBC # BLD AUTO: 6.1 K/UL (ref 4.2–12.2)
WBC #/AREA URNS HPF: (no result) /[HPF]

## 2018-08-11 PROCEDURE — 80053 COMPREHEN METABOLIC PANEL: CPT

## 2018-08-11 PROCEDURE — 99284 EMERGENCY DEPT VISIT MOD MDM: CPT

## 2018-08-11 PROCEDURE — 96374 THER/PROPH/DIAG INJ IV PUSH: CPT

## 2018-08-11 PROCEDURE — 93005 ELECTROCARDIOGRAM TRACING: CPT

## 2018-08-11 PROCEDURE — 85025 COMPLETE CBC W/AUTO DIFF WBC: CPT

## 2018-08-11 PROCEDURE — 83690 ASSAY OF LIPASE: CPT

## 2018-08-11 PROCEDURE — 93010 ELECTROCARDIOGRAM REPORT: CPT

## 2018-08-11 PROCEDURE — 74176 CT ABD & PELVIS W/O CONTRAST: CPT

## 2018-08-11 PROCEDURE — 96375 TX/PRO/DX INJ NEW DRUG ADDON: CPT

## 2018-08-11 PROCEDURE — 81001 URINALYSIS AUTO W/SCOPE: CPT

## 2018-08-11 NOTE — EMERGENCY DEPARTMENT RECORD
History of Present Illness





- General


Chief complaint: Female Urogenital Problem


Stated complaint: UTI


Time Seen by Provider: 08/11/18 20:29


Source: Patient, Old records reviewed


Limitations: No limitations





- History of Present Illness


Initial comments: 


73 yo female presents with right sided flank pain and back pain that started 

earlier this evening.  No fall or injury.  The pain seems to come and go in 

waves.  No fevers.  She has had nausea.  She has had her gall bladder removed 

in the past.  No diarrhea.  No rash.  She has had similar symptoms with UTI in 

the past.  She has had renal stones in the past as well.  She has not vomited.  

No blood in the stools.





MD Complaint: Dysuria


-: Hour(s)


Radiation: R flank


Quality: Aching


Consistency: Intermittent


Improves with: None


Worsens with: None


Associated Symptoms: Loss of appetite, Nausea/vomiting





- Related Data


 Home Medications











 Medication  Instructions  Recorded  Confirmed  Last Taken


 


Triseba 30 units SC DAILY 08/11/18  Unknown








 Previous Rx's











 Medication  Instructions  Recorded


 


Ciprofloxacin HCl [Cipro] 500 mg PO Q12HR #14 tablet 08/11/18











 Allergies











Allergy/AdvReac Type Severity Reaction Status Date / Time


 


aspirin Allergy Intermediate NAUSEA Unverified 05/21/18 11:09


 


Sulfa (Sulfonamide Allergy Intermediate NAUSEA AND Unverified 05/21/18 11:09





Antibiotics)   VOMITING  


 


penicillin V Allergy Mild NAUSEA Unverified 05/21/18 11:09


 


clindamycin AdvReac  NAUSEA Unverified 05/21/18 11:09














Review of Systems


Constitutional: Denies: Chills, Fever, Malaise, Weakness


Eyes: Denies: Eye discharge, Eye pain


ENT: Denies: Congestion, Throat pain


Respiratory: Denies: Cough, Dyspnea


Cardiovascular: Denies: Chest pain, Palpitations, Syncope


Endocrine: Denies: Fatigue


Gastrointestinal: Reports: As per HPI, Abdominal pain, Nausea.  Denies: 

Constipation, Diarrhea, Hematemesis, Hematochezia, Melena, Vomiting


Genitourinary: Reports: As per HPI.  Denies: Dysuria, Retention, Urgency


Musculoskeletal: Reports: Back pain.  Denies: Arthralgia, Myalgia


Skin: Denies: Bruising, Change in color, Rash


Neurological: Denies: Headache, Weakness


Psychiatric: Denies: Anxiety


Hematological/Lymphatic: Denies: Blood Clots, Easy bleeding, Easy bruising, 

Swollen glands





Past Medical History





- SOCIAL HISTORY


Smoking Status: Never smoker


Drug Use: None





- RESPIRATORY


Hx Respiratory Disorders: Yes


Hx Sleep Apnea: Yes (dx)


Hx of CPAP: No





- CARDIOVASCULAR


Hx Cardio Disorders: Yes


Hx Cardiac Cath: Yes


Hx Edema: Yes


Hx Heart Attack: Yes (2008 -echo in 2016)


Hx Hypertension: Yes ((denies HTN on meds for kidneys))


Hx Coronary Stent: Yes (1-2008, 2-2009)





- NEURO


Hx Neuro Disorders: Yes


Hx of Migraines: Yes (hx)


Hx Neuropathy: Yes (right hand, left great toe)





- GI


Hx GI Disorders: Yes


Hx Abdominal Pain: Yes (epigastric)


Hx Reflux: Yes ("belching/hiccups")


Hx Hiatal Hernia: Yes


Hx of Polyps: Yes





- 


Hx Genitourinary Disorders: No





- ENDOCRINE


Hx Endocrine Disorders: Yes





- MUSCULOSKELETAL


Hx Musculoskeletal Disorders: Yes


Comment:: right shoulder injury





- PSYCH


Hx Psych Problems: No





- HEMATOLOGY/ONCOLOGY


Hx Hematology/Oncology Disorders: No





Family Medical History


Hx Cancer: Father, Brother/Sister, Grandparents


*Cancer Comment: colon, breast, ovarian


Hx Diabetes: Brother/Sister


Hx Heart Disease: Mother





Physical Exam





- General


General Appearance: Alert, Oriented x3, Cooperative, No acute distress


Limitations: No limitations





- Head


Head exam: Atraumatic, Normocephalic, Normal inspection





- Eye


Eye exam: Normal appearance.  negative: Conjunctival injection, Scleral icterus





- ENT


ENT exam: Normal exam


Ear exam: Normal external inspection


Nasal Exam: Normal inspection


Mouth exam: Normal external inspection


Teeth exam: Normal inspection





- Neck


Neck exam: Normal inspection





- Respiratory


Respiratory exam: Normal lung sounds bilaterally.  negative: Respiratory 

distress





- Cardiovascular


Cardiovascular Exam: Regular rate, Normal rhythm, Normal heart sounds





- GI/Abdominal


GI/Abdominal exam: Soft.  negative: Distended, Guarding, Rebound, Rigid, 

Tenderness





- Rectal


Rectal exam: Deferred





- 


 exam: Deferred





- Extremities


Extremities exam: Normal inspection





- Back


Back exam: Reports: CVA tenderness (R), Paraspinal tenderness, Tenderness.  

Denies: CVA tenderness (L), Muscle spasm





- Neurological


Neurological exam: Alert, Oriented X3





- Psychiatric


Psychiatric exam: Normal affect, Normal mood.  negative: Agitated, Anxious





- Skin


Skin exam: Dry, Intact, Normal color, Warm





Course





- Reevaluation(s)


Reevaluation #1: 





08/11/18 21:06


The CBC was reviewed


Chronic anemia


UA with a small amount of blood. 


08/11/18 21:35


The CMP was reviewed.  Glucose is 240 otherwise no acute significant changes.





08/11/18 21:36


Waiting for CT results


08/11/18 21:40





08/11/18 21:49


The pain and nausea are improving.  She states she is comfortable.  Waiting for 

the radiology report. 


08/11/18 21:51


BP on recheck is much improved.


08/11/18 22:11


The CT scan was reviewed.  Post OP Orly and Appy.  Calcified granuloma RML, 

degenerative spine changes, questionable inflammation of the panniculus - 

clnical correlation requested. 





She now states over several weeks she occasional gets a fleeting sharp chest 

pain.  Last seconds.  Non exertion related.  Not sustained


EKG ordered





EKG #1


Rate  75


Rhythm  Sinus


[place]  Left


Intervals  Normal


ST segments  Poor R wave, non specific changes


Prior  12/8/18  NO changes on today's compared to prior.


08/11/18 22:18





08/11/18 22:28


The abdomen was examined.  No signs of cellulitis in the area the CT noted


We discussed the CT does not demonstrate obvious cause for her right side pain


The urine will be cultured with the few bacteria and blood


I explained this may not be the cause of her pain


The patient was given instructions to call her PCP this week and we discussed 

reasons to return to the ED





Medical Decision Making





- Lab Data


Result diagrams: 


 08/11/18 20:50





 08/11/18 20:50





Disposition


Disposition: Discharge


Clinical Impression: 


 Right flank pain





Disposition: Home, Self-Care


Condition: (1) Good


Instructions:  Abdominal Pain (ED)


Additional Instructions: 


Return to the ED if you have persistent abdominal pain, any vomiting or fever


Return or see your heart doctor if any chest pain occurs with activity, walking 

or last more than a few seconds


Watch your skin for rash where your right side pain is located


A culture was sent of your urine that can be checked with your doctor in 3 days.





Prescriptions: 


Ciprofloxacin HCl [Cipro] 500 mg PO Q12HR #14 tablet


Forms:  Patient Portal Access


Time of Disposition: 22:28





Quality





- Quality Measures


Quality Measures: N/A





- Blood Pressure Screening


Does Patient Have Any of the Following: Active Dx of HTN


Blood Pressure Classification: Hypertensive Reading


Systolic Measurement: 147


Diastolic Measurement: 56


Screening for High Blood Pressure: Patient Exclusion, Hx of HTN []

## 2018-08-13 NOTE — CT SCAN REPORT
EXAM:  EMERGENCY CT SCAN OF THE ABDOMEN AND PELVIS



HISTORY:  RIGHT FLANK PAIN FOR THREE DAYS.  APPENDECTOMY, CHOLECYSTECTOMY. 



TECHNIQUE:  Axial CT scan of the abdomen and pelvis was performed without oral 
or IV contrast.  



Comparison:  No prior CT abdomen or pelvis with which to compare.  



Encounter:  Not applicable.  



FINDINGS:  Small calcified granuloma right middle lobe.  



Cardiomegaly.  



Surgical clips gallbladder fossa consistent with cholecystectomy.  The appendix 
is not identified consistent with the surgical history as well.  



Small periumbilical anterior abdominal wall hernia containing adipose tissue, 
but no bowel.  



No intrarenal calculi identified on either side.  Small central renal calculus 
on the right is probably a renal arterial calcification.  There is no 
hydronephrosis or hydroureter on either side.  No ureteral calculus seen on 
either side and no bladder calculus evident.  



Evaluation of the bowel and viscera is extremely limited without oral or IV 
contrast.  Given this limitation, no definite hepatic, splenic, adrenal, 
pancreatic, or renal mass identified.  No free intraperitoneal air or free 
intraperitoneal fluid evident.  Multilevel degenerative disk disease in the 
lumbar spine and some facet joint arthropathy in the lumbar spine as well.  
Quite prominent spurring in the visualized lower thoracic spine.  



There is a relatively large panniculus extending down below the pelvis.  
Suggestion of some mild edema or cellulitis bilaterally in the anterior 
abdominal wall of this panniculus just below the level of the umbilicus.  
Correlation with physical examination is suggested.



IMPRESSION:  

1.  POSTOP CHOLECYSTECTOMY AND APPENDECTOMY.  



2.  NO DEFINITE URINARY TRACT CALCULI OR HYDRONEPHROSIS EVIDENT.  



3.  MULTILEVEL DEGENERATIVE CHANGE IN THE SPINE.  



4.  SMALL PERIUMBILICAL ANTERIOR ABDOMINAL WALL HERNIA CONTAINING ADIPOSE TISSUE
, BUT NO BOWEL.  



5.  CALCIFIED GRANULOMA RIGHT MIDDLE LOBE.  



6.  CARDIOMEGALY.  



JOB NUMBER:  [id number] and [id number]
[facility]